# Patient Record
Sex: MALE | Race: WHITE | Employment: FULL TIME | URBAN - METROPOLITAN AREA
[De-identification: names, ages, dates, MRNs, and addresses within clinical notes are randomized per-mention and may not be internally consistent; named-entity substitution may affect disease eponyms.]

---

## 2017-12-14 ENCOUNTER — OFFICE VISIT (OUTPATIENT)
Dept: SURGERY | Facility: CLINIC | Age: 62
End: 2017-12-14

## 2017-12-14 VITALS
DIASTOLIC BLOOD PRESSURE: 72 MMHG | BODY MASS INDEX: 21.67 KG/M2 | HEART RATE: 59 BPM | SYSTOLIC BLOOD PRESSURE: 125 MMHG | TEMPERATURE: 99 F | WEIGHT: 160 LBS | HEIGHT: 72 IN

## 2017-12-14 DIAGNOSIS — K62.5 BRIGHT RED RECTAL BLEEDING: Primary | ICD-10-CM

## 2017-12-14 DIAGNOSIS — Z87.19 HISTORY OF HEMORRHOIDS: ICD-10-CM

## 2017-12-14 DIAGNOSIS — K62.89 ANAL PAIN: ICD-10-CM

## 2017-12-14 PROCEDURE — 46600 DIAGNOSTIC ANOSCOPY SPX: CPT | Performed by: COLON & RECTAL SURGERY

## 2017-12-14 PROCEDURE — 99243 OFF/OP CNSLTJ NEW/EST LOW 30: CPT | Performed by: COLON & RECTAL SURGERY

## 2017-12-14 RX ORDER — SILODOSIN 4 MG/1
4 CAPSULE ORAL DAILY
COMMUNITY

## 2017-12-14 NOTE — PATIENT INSTRUCTIONS
The patient presents for evaluation of rectal bleeding and rectal pain. The underwent an excisional hemorrhoidectomy on January 13th. His last office visit was January 26th, he had some normal postoperative pain and bleeding at that time.     The patient re

## 2017-12-14 NOTE — PROGRESS NOTES
Visit Note       Active Problems      1. Bright red rectal bleeding    2. Anal pain    3. History of hemorrhoids          Chief Complaint   Patient presents with:  Anal Problem (GI): Hemorrhoidectomy 2015 - states it never healed and bleeding since.  PT has Comment: lymphnoid, right groin 1970's  No date: OTHER SURGICAL HISTORY      Comment: right hand, middle finger 1975 or 78  No date: TONSILLECTOMY    The family history and social history have been reviewed by me today.     Family History   Problem Relation constipation, diarrhea, nausea and vomiting. Genitourinary: Negative for difficulty urinating, dysuria, frequency and urgency. Musculoskeletal: Negative for arthralgias and myalgias. Skin: Negative for color change and rash.    Neurological: Negative pruritis ani. No acute anal fissure or fistula. His prostate was normal in size. He has normal rectal tone. Anoscopy was negative for internal hemorrhoids, irritation or bleeding.      The patient was seen today in follow up for evaluation of rectal bleedin

## 2017-12-15 NOTE — PROCEDURES
Procedure:  Anoscopy    Surgeon: Horace Menjivar    Anesthesia: None    Findings: See the progress note attached for all findings    Operative Summary: The patient was placed in a prone position on the proctoscopy table, the hips were flexed in the jackknife p

## 2019-06-12 ENCOUNTER — OFFICE VISIT (OUTPATIENT)
Dept: SURGERY | Facility: CLINIC | Age: 64
End: 2019-06-12
Payer: COMMERCIAL

## 2019-06-12 VITALS
BODY MASS INDEX: 20.32 KG/M2 | DIASTOLIC BLOOD PRESSURE: 80 MMHG | WEIGHT: 150 LBS | HEIGHT: 72 IN | SYSTOLIC BLOOD PRESSURE: 139 MMHG | HEART RATE: 62 BPM

## 2019-06-12 DIAGNOSIS — K64.4 EXTERNAL PROLAPSED HEMORRHOIDS: Primary | ICD-10-CM

## 2019-06-12 PROCEDURE — 99214 OFFICE O/P EST MOD 30 MIN: CPT | Performed by: SURGERY

## 2019-06-12 RX ORDER — PREDNISONE 1 MG/1
TABLET ORAL
COMMUNITY
Start: 2019-04-23

## 2019-06-12 NOTE — H&P
Colt Saunders is a 66year old male  Patient presents with:  Anal / Rectal Problem: EST [facility] PT - PT C/O EXTERNAL HEMORRHOID. PT STATES HE'S HAVING 5/10 PAIN AND SOME PINKISH DRAINAGE. REFERRED BY    Patient presents to discuss severe perianal pain. no weight loss, no fever or chills  SKIN: denies any unusual skin rashes or jaundice  HEENT: denies nasal congestion, sinus pain or sore throat; hearing loss negative,   EYES , no diplopia or vision changes  RESPIRATORY: denies shortness of breath, wheezin this time I will see him back in the office in 3 weeks time for further evaluation      Meds & Refills for this Visit:  Requested Prescriptions      No prescriptions requested or ordered in this encounter       Imaging & Consults:  None

## 2019-07-01 ENCOUNTER — OFFICE VISIT (OUTPATIENT)
Dept: SURGERY | Facility: CLINIC | Age: 64
End: 2019-07-01

## 2019-07-01 VITALS
SYSTOLIC BLOOD PRESSURE: 130 MMHG | WEIGHT: 150 LBS | TEMPERATURE: 98 F | DIASTOLIC BLOOD PRESSURE: 74 MMHG | BODY MASS INDEX: 20 KG/M2 | HEART RATE: 53 BPM

## 2019-07-01 DIAGNOSIS — K62.5 BRIGHT RED RECTAL BLEEDING: ICD-10-CM

## 2019-07-01 DIAGNOSIS — K64.1 PROLAPSED INTERNAL HEMORRHOIDS, GRADE 2: ICD-10-CM

## 2019-07-01 DIAGNOSIS — Z80.0 FAMILY HISTORY OF COLON CANCER: ICD-10-CM

## 2019-07-01 DIAGNOSIS — D12.6 ADENOMATOUS POLYP OF COLON, UNSPECIFIED PART OF COLON: ICD-10-CM

## 2019-07-01 DIAGNOSIS — K64.4 EXTERNAL PROLAPSED HEMORRHOIDS: ICD-10-CM

## 2019-07-01 DIAGNOSIS — K92.2 INTESTINAL BLEEDING: Primary | ICD-10-CM

## 2019-07-01 PROCEDURE — 46600 DIAGNOSTIC ANOSCOPY SPX: CPT | Performed by: COLON & RECTAL SURGERY

## 2019-07-01 PROCEDURE — 99243 OFF/OP CNSLTJ NEW/EST LOW 30: CPT | Performed by: COLON & RECTAL SURGERY

## 2019-07-01 RX ORDER — IBUPROFEN 200 MG
200 TABLET ORAL EVERY 6 HOURS PRN
COMMUNITY

## 2019-07-01 NOTE — PROGRESS NOTES
Follow Up Visit Note       Active Problems      1. Intestinal bleeding    2. Prolapsed internal hemorrhoids, grade 2    3. External prolapsed hemorrhoids    4. Bright red rectal bleeding    5. Adenomatous polyp of colon, unspecified part of colon    6.  Vanita Evangelista DONNY Gomez    I agree with the note as scribed by the PA  I performed my own physical exam and obtained the history as detailed above.   I have made all appropriate changes in documentation as necessary  I attest to the accuracy of this note as detailed Cap, Take 1,000 mg by mouth daily. , Disp: , Rfl:   •  Calcium Carbonate-Vitamin D (CALCIUM 600 + D OR), Take  by mouth., Disp: , Rfl:   •  Coenzyme Q10 (CO Q 10) 100 MG Oral Cap, Take  by mouth., Disp: , Rfl:      Review of Systems  The Review of Systems h present. No thyroid mass and no thyromegaly present. Cardiovascular: Normal rate, regular rhythm, S1 normal, S2 normal, normal heart sounds and intact distal pulses. No murmur heard.   Pulmonary/Chest: Effort normal and breath sounds normal. No accessor Right: No inguinal and no supraclavicular adenopathy present. Left: No inguinal and no supraclavicular adenopathy present. Neurological: He is alert and oriented to person, place, and time. He has normal reflexes.    Skin: Skin is warm and d operations. Physical exam: The abdomen is soft, nondistended, and nontender. No rebound pain, guarding, or peritoneal signs. External examination of the anus reveals no external hemorrhoids.   There is not even a suggestion of a healing external thro

## 2019-07-01 NOTE — PATIENT INSTRUCTIONS
The patient is a pleasant 51-year-old male who presents in consultation for rectal pain and bleeding. He is referred to me by his primary provider, Dr. Yury Linton. I am seeing this patient in the role of a colon and rectal surgeon.     The patient is known patient's new symptoms, as well as his personal and family history of colon polyps/colon cancer, he should undergo colonoscopy every 3 years. He is currently due for his next colonoscopy. We will proceed at the next mutually convenient available date.

## 2019-07-02 RX ORDER — POLYETHYLENE GLYCOL 3350, SODIUM CHLORIDE, SODIUM BICARBONATE, POTASSIUM CHLORIDE 420; 11.2; 5.72; 1.48 G/4L; G/4L; G/4L; G/4L
POWDER, FOR SOLUTION ORAL
Qty: 1 BOTTLE | Refills: 0 | Status: SHIPPED | OUTPATIENT
Start: 2019-07-02

## 2019-07-03 NOTE — PROCEDURES
Procedure:  Anoscopy    Surgeon: Vipin Vitale    Anesthesia: None    Findings: See the progress note attached for all findings    Operative Summary: The patient was placed in a prone position on the proctoscopy table, the hips were flexed in the jackknife p

## 2019-07-29 ENCOUNTER — PATIENT OUTREACH (OUTPATIENT)
Dept: SURGERY | Facility: CLINIC | Age: 64
End: 2019-07-29

## (undated) NOTE — LETTER
12/15/17    Patient: Anna Mcgee  : 1955 Visit date: 2017    Dear  Dr. Neel Pink,    Thank you for referring Anna Mcgee to my practice. Please find my assessment and plan below.                Assessment   Bright red rectal bleeding  ( The patient can follow up with us on an as needed basis if the bleeding or pain becomes a regular problem. We discussed the above plan and all questions were answered. We will see him when he is due for the next colonoscopy which is in 2018.  The preet

## (undated) NOTE — LETTER
19    Patient: Fernando Coughlin  : 1955 Visit date: 2019    Dear  Dr. Ramírez Travis,    Thank you for referring Marlojanessa Madyson to my practice. Please find my assessment and plan below.             ASSESSMENT   Imp: Thrombosed left lateral hemo

## (undated) NOTE — LETTER
2019    Return to School / Work    Name: Ivy Stein        : 1955    To Whom It May Concern,    Ivy Stein was seen in our office on 19 and is:    Able to return to school / work without restrictions on 19.     Comments:    I

## (undated) NOTE — LETTER
19    Patient: Antonette Krause  : 1955 Visit date: 2019    Dear  Dr. Ayse Gallardo,    Thank you for referring Antonette Krause to my practice. Please find my assessment and plan below.          Assessment   Intestinal bleeding  (primary encount is not even a suggestion of a healing external thrombosed hemorrhoid. Digital rectal exam reveals a prostate which is at the upper limits of normal, but symmetric, nontender, and without nodules.     The patient does not require any operative intervention